# Patient Record
Sex: FEMALE | Race: WHITE | NOT HISPANIC OR LATINO | Employment: STUDENT | URBAN - METROPOLITAN AREA
[De-identification: names, ages, dates, MRNs, and addresses within clinical notes are randomized per-mention and may not be internally consistent; named-entity substitution may affect disease eponyms.]

---
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
## 2022-08-23 ENCOUNTER — TRANSFERRED RECORDS (OUTPATIENT)
Dept: HEALTH INFORMATION MANAGEMENT | Facility: CLINIC | Age: 22
End: 2022-08-23

## 2022-08-29 ENCOUNTER — MEDICAL CORRESPONDENCE (OUTPATIENT)
Dept: HEALTH INFORMATION MANAGEMENT | Facility: CLINIC | Age: 22
End: 2022-08-29

## 2022-09-06 ENCOUNTER — TRANSCRIBE ORDERS (OUTPATIENT)
Dept: OTHER | Age: 22
End: 2022-09-06

## 2022-09-06 DIAGNOSIS — E23.6 RATHKE'S CYST (H): Primary | ICD-10-CM

## 2022-09-06 DIAGNOSIS — E23.6 RATHKE'S CLEFT CYST (H): ICD-10-CM

## 2022-09-08 ENCOUNTER — MEDICAL CORRESPONDENCE (OUTPATIENT)
Dept: HEALTH INFORMATION MANAGEMENT | Facility: CLINIC | Age: 22
End: 2022-09-08

## 2022-09-13 ENCOUNTER — TELEPHONE (OUTPATIENT)
Dept: NEUROSURGERY | Facility: CLINIC | Age: 22
End: 2022-09-13

## 2022-09-13 NOTE — TELEPHONE ENCOUNTER
Writer ERNEE for pt regarding neurosurgery referral    Please schedule a new, in person or video visit with Dr. Chiu or Luna Saunders for next available    Lizett Rush

## 2022-09-14 NOTE — TELEPHONE ENCOUNTER
9/14/2022-Spoke with [facility] to have images pushed ASAP-MR @ 201pm    9/20/2022-Images now in PACS-MR @ 606am    9/23/2022-Request for images faxed to [facility]-MR @ 1140am  -[facility] Images now in PACS, Reports scanned to chart-MR @ 226pm      FUTURE VISIT INFORMATION      FUTURE VISIT INFORMATION:    Date: 9/23/2022    Time: 1pm    Location: [facility]  REFERRAL INFORMATION:    Referring provider:  Dr. Chaudhary    Referring providers clinic:  [facility]     Reason for visit/diagnosis  Rathke's Cleft Cyst     RECORDS REQUESTED FROM:       Clinic name Comments Records Status Imaging Status   [facility]   Scanned to chart  Requested          [facility]   Care Everywhere Requested          [facility]  Requested  Requested

## 2022-09-23 ENCOUNTER — LAB (OUTPATIENT)
Dept: LAB | Facility: CLINIC | Age: 22
End: 2022-09-23

## 2022-09-23 ENCOUNTER — OFFICE VISIT (OUTPATIENT)
Dept: NEUROSURGERY | Facility: CLINIC | Age: 22
End: 2022-09-23
Payer: COMMERCIAL

## 2022-09-23 ENCOUNTER — PRE VISIT (OUTPATIENT)
Dept: NEUROSURGERY | Facility: CLINIC | Age: 22
End: 2022-09-23

## 2022-09-23 VITALS — DIASTOLIC BLOOD PRESSURE: 83 MMHG | OXYGEN SATURATION: 97 % | SYSTOLIC BLOOD PRESSURE: 136 MMHG | HEART RATE: 95 BPM

## 2022-09-23 DIAGNOSIS — D35.2 BENIGN NEOPLASM OF PITUITARY GLAND AND CRANIOPHARYNGEAL DUCT (POUCH) (H): ICD-10-CM

## 2022-09-23 DIAGNOSIS — D35.3 BENIGN NEOPLASM OF PITUITARY GLAND AND CRANIOPHARYNGEAL DUCT (POUCH) (H): Primary | ICD-10-CM

## 2022-09-23 DIAGNOSIS — D35.3 BENIGN NEOPLASM OF PITUITARY GLAND AND CRANIOPHARYNGEAL DUCT (POUCH) (H): ICD-10-CM

## 2022-09-23 DIAGNOSIS — D35.2 BENIGN NEOPLASM OF PITUITARY GLAND AND CRANIOPHARYNGEAL DUCT (POUCH) (H): Primary | ICD-10-CM

## 2022-09-23 LAB
ANION GAP SERPL CALCULATED.3IONS-SCNC: 11 MMOL/L (ref 7–15)
BUN SERPL-MCNC: 7.3 MG/DL (ref 6–20)
CALCIUM SERPL-MCNC: 9.5 MG/DL (ref 8.6–10)
CHLORIDE SERPL-SCNC: 110 MMOL/L (ref 98–107)
CORTIS SERPL-MCNC: 7.9 UG/DL
CREAT SERPL-MCNC: 0.66 MG/DL (ref 0.51–0.95)
DEPRECATED HCO3 PLAS-SCNC: 20 MMOL/L (ref 22–29)
FSH SERPL IRP2-ACNC: 6.2 MIU/ML
GFR SERPL CREATININE-BSD FRML MDRD: >90 ML/MIN/1.73M2
GLUCOSE SERPL-MCNC: 100 MG/DL (ref 70–99)
LH SERPL-ACNC: 8.6 MIU/ML
POTASSIUM SERPL-SCNC: 3.7 MMOL/L (ref 3.4–5.3)
PROLACTIN SERPL 3RD IS-MCNC: 8 NG/ML (ref 5–23)
SODIUM SERPL-SCNC: 141 MMOL/L (ref 136–145)
T4 FREE SERPL-MCNC: 1.27 NG/DL (ref 0.9–1.7)
TSH SERPL DL<=0.005 MIU/L-ACNC: 1.5 UIU/ML (ref 0.3–4.2)

## 2022-09-23 PROCEDURE — 84146 ASSAY OF PROLACTIN: CPT | Mod: 90 | Performed by: PATHOLOGY

## 2022-09-23 PROCEDURE — 99000 SPECIMEN HANDLING OFFICE-LAB: CPT | Performed by: PATHOLOGY

## 2022-09-23 PROCEDURE — 83002 ASSAY OF GONADOTROPIN (LH): CPT | Mod: 90 | Performed by: PATHOLOGY

## 2022-09-23 PROCEDURE — 83003 ASSAY GROWTH HORMONE (HGH): CPT | Mod: 90 | Performed by: PATHOLOGY

## 2022-09-23 PROCEDURE — 84439 ASSAY OF FREE THYROXINE: CPT | Mod: 90 | Performed by: PATHOLOGY

## 2022-09-23 PROCEDURE — 36415 COLL VENOUS BLD VENIPUNCTURE: CPT | Performed by: PATHOLOGY

## 2022-09-23 PROCEDURE — 82533 TOTAL CORTISOL: CPT | Mod: 90 | Performed by: PATHOLOGY

## 2022-09-23 PROCEDURE — 84443 ASSAY THYROID STIM HORMONE: CPT | Mod: 90 | Performed by: PATHOLOGY

## 2022-09-23 PROCEDURE — 84305 ASSAY OF SOMATOMEDIN: CPT | Mod: 90 | Performed by: PATHOLOGY

## 2022-09-23 PROCEDURE — 84403 ASSAY OF TOTAL TESTOSTERONE: CPT | Mod: 90 | Performed by: PATHOLOGY

## 2022-09-23 PROCEDURE — 82024 ASSAY OF ACTH: CPT | Mod: 90 | Performed by: PATHOLOGY

## 2022-09-23 PROCEDURE — 83001 ASSAY OF GONADOTROPIN (FSH): CPT | Mod: 90 | Performed by: PATHOLOGY

## 2022-09-23 PROCEDURE — 80048 BASIC METABOLIC PNL TOTAL CA: CPT | Performed by: PATHOLOGY

## 2022-09-23 PROCEDURE — 99204 OFFICE O/P NEW MOD 45 MIN: CPT | Performed by: PHYSICIAN ASSISTANT

## 2022-09-23 RX ORDER — CETIRIZINE HYDROCHLORIDE 10 MG/1
10 TABLET, CHEWABLE ORAL DAILY
COMMUNITY

## 2022-09-23 RX ORDER — TOPIRAMATE 50 MG/1
50 TABLET, FILM COATED ORAL 2 TIMES DAILY
COMMUNITY

## 2022-09-23 ASSESSMENT — PAIN SCALES - GENERAL: PAINLEVEL: NO PAIN (0)

## 2022-09-23 NOTE — LETTER
2022       RE: Tammy Smith  [address]   [facility] [address]     Dear Colleague,    Thank you for referring your patient, Tammy Smith, to the [facility] [place] at [facility]. Please see a copy of my visit note below.      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 She reports that she developed new migraine headaches in  of this year. Additionally, she has not had a menstrual period since 2021. She was using Depo-provera for increased menstrual cramping, last injection >6 months ago. She reports weight gain, nausea, intermittent abdominal pain, and diarrhea ongoing for the past 1 year. MRI was performed due to her new headaches which revealed a cystic pituitary mass measuring 4mm. She reports headaches were 4-5 days per week, now 1 per week after being placed on daily Topamax. She denies new fever, blurry or double vision, dizziness, gait instability, paresthesias, or weakness.     She is single, lives with her parents. She was working as a  but recently quit her job, is currently looking for something new.   She's a nonsmoker.     Review of Systems:   Pertinent items are noted in HPI or as in patient entered ROS below, remainder of complete ROS is negative.     Active Medications:     Current Outpatient Medications:      cetirizine (ZYRTEC) 10 MG CHEW, Take 10 mg by mouth daily, Disp: , Rfl:      topiramate (TOPAMAX) 50 MG tablet, Take 50 mg by mouth 2 times daily, Disp: , Rfl:       Allergies:   Cats and Lupine bean  extract      Past Medical History:  Amenorrhea  New migraine headaches     Past Surgical History:  None    Family History:   Noncontributory     Social History:   Social History     Tobacco Use     Smoking status: Never Smoker     Smokeless tobacco: Never Used        Physical Exam:   /83   Pulse 95   SpO2 97%    General: No acute distress.   Eyes: Conjunctivae are normal.  MSK: Moves all extremities.  No obvious deformity.  Neuro: The patient is fully oriented. Speech is normal. Extraocular movements are intact without nystagmus. Facial sensation is intact in V1, V2, V3 distributions. Facial nerve function is normal, rated as a House Brackmann 1. Shoulders are full strength. There is no pronator drift. Full strength in all extremities. Sensation intact throughout. No dysmetria with finger-nose-finger testing. Gait is normal   Psych: Normal mood and affect. Behavior is normal.      Imaging:  We reviewed the MRI done 8/23/2022 which revealed a 4mm cystic appearing pituitary lesion that does not come into contact with the optic apparatus.      Assessment:  1. 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 Amenorrhea  3. Migraine headaches  4. Nausea, diarrhea, and abdominal pain    Plan:  1. Will plan to repeat imaging in 3 months prior to next follow up in clinic. We discussed the differential for this mass which would include Rathke's cleft cyst vs craniopharyngioma vs pituitary adenoma. We will obtain a pituitary panel today and follow up by phone with results. If any abnormality, will try to fit her in sooner than end of December for Endo consult.   2. As above, lab work today. Unclear if related to this mass. Would recommend OB/Gyn consult if labs WNL.  3. Continue Topamax. Will follow, consider Neurology consult if not continuing to improve.   4. Likely unrelated, advised discussion with PCP.    Luna Saunders PA-C  Department of Neurosurgery  Center for Skull Base and Pituitary Surgery  [place]  [facility]

## 2022-09-23 NOTE — PROGRESS NOTES
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 She reports that she developed new migraine headaches in  of this year. Additionally, she has not had a menstrual period since 2021. She was using Depo-provera for increased menstrual cramping, last injection >6 months ago. She reports weight gain, nausea, intermittent abdominal pain, and diarrhea ongoing for the past 1 year. MRI was performed due to her new headaches which revealed a cystic pituitary mass measuring 4mm. She reports headaches were 4-5 days per week, now 1 per week after being placed on daily Topamax. She denies new fever, blurry or double vision, dizziness, gait instability, paresthesias, or weakness.     She is single, lives with her parents. She was working as a  but recently quit her job, is currently looking for something new.   She's a nonsmoker.     Review of Systems:   Pertinent items are noted in HPI or as in patient entered ROS below, remainder of complete ROS is negative.     Active Medications:     Current Outpatient Medications:      cetirizine (ZYRTEC) 10 MG CHEW, Take 10 mg by mouth daily, Disp: , Rfl:      topiramate (TOPAMAX) 50 MG tablet, Take 50 mg by mouth 2 times daily, Disp: , Rfl:       Allergies:   Cats and Lupine bean extract      Past Medical History:  Amenorrhea  New migraine headaches     Past Surgical History:  None    Family History:   Noncontributory     Social History:   Social History     Tobacco Use     Smoking status: Never Smoker     Smokeless tobacco: Never Used        Physical Exam:   /83   Pulse 95   SpO2 97%     General: No acute distress.   Eyes: Conjunctivae are normal.  MSK: Moves all extremities.  No obvious deformity.  Neuro: The patient is fully oriented. Speech is normal. Extraocular movements are intact without nystagmus. Facial sensation is intact in V1, V2, V3 distributions. Facial nerve function is normal, rated as a House Brackmann 1. Shoulders are full strength. There is no pronator drift. Full strength in all extremities. Sensation intact throughout. No dysmetria with finger-nose-finger testing. Gait is normal   Psych: Normal mood and affect. Behavior is normal.      Imaging:  We reviewed the MRI done 8/23/2022 which revealed a 4mm cystic appearing pituitary lesion that does not come into contact with the optic apparatus.      Assessment:  1. 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 Amenorrhea  3. Migraine headaches  4. Nausea, diarrhea, and abdominal pain    Plan:  1. Will plan to repeat imaging in 3 months prior to next follow up in clinic. We discussed the differential for this mass which would include Rathke's cleft cyst vs craniopharyngioma vs pituitary adenoma. We will obtain a pituitary panel today and follow up by phone with results. If any abnormality, will try to fit her in sooner than end of December for Endo consult.   2. As above, lab work today. Unclear if related to this mass. Would recommend OB/Gyn consult if labs WNL.  3. Continue Topamax. Will follow, consider Neurology consult if not continuing to improve.   4. Likely unrelated, advised discussion with PCP.    Luna Saunders PA-C  Department of Neurosurgery  Center for Skull Base and Pituitary Surgery  [facility]

## 2022-09-26 LAB — GH SERPL-MCNC: 5 UG/L

## 2022-09-27 LAB
ACTH PLAS-MCNC: 15 PG/ML
IGF-I BLD-MCNC: 270 NG/ML (ref 109–372)
TESTOST SERPL-MCNC: 17 NG/DL (ref 8–60)

## 2022-09-29 ENCOUNTER — OFFICE VISIT (OUTPATIENT)
Dept: FAMILY MEDICINE | Facility: CLINIC | Age: 22
End: 2022-09-29
Payer: COMMERCIAL

## 2022-09-29 VITALS
DIASTOLIC BLOOD PRESSURE: 74 MMHG | OXYGEN SATURATION: 97 % | SYSTOLIC BLOOD PRESSURE: 100 MMHG | TEMPERATURE: 99 F | HEART RATE: 119 BPM | WEIGHT: 158 LBS

## 2022-09-29 DIAGNOSIS — Z11.3 SCREENING FOR STDS (SEXUALLY TRANSMITTED DISEASES): ICD-10-CM

## 2022-09-29 DIAGNOSIS — R76.8 ANA POSITIVE: ICD-10-CM

## 2022-09-29 DIAGNOSIS — G44.209 TENSION HEADACHE: Primary | ICD-10-CM

## 2022-09-29 DIAGNOSIS — Z11.59 NEED FOR HEPATITIS C SCREENING TEST: ICD-10-CM

## 2022-09-29 DIAGNOSIS — R11.0 NAUSEA: ICD-10-CM

## 2022-09-29 DIAGNOSIS — E23.6: ICD-10-CM

## 2022-09-29 DIAGNOSIS — Z11.4 SCREENING FOR HIV (HUMAN IMMUNODEFICIENCY VIRUS): ICD-10-CM

## 2022-09-29 PROCEDURE — 99213 OFFICE O/P EST LOW 20 MIN: CPT | Performed by: INTERNAL MEDICINE

## 2022-09-29 RX ORDER — PROCHLORPERAZINE MALEATE 10 MG
10 TABLET ORAL EVERY 6 HOURS PRN
Qty: 30 TABLET | Refills: 4 | Status: SHIPPED | OUTPATIENT
Start: 2022-09-29

## 2022-09-29 NOTE — PROGRESS NOTES
Assessment & Plan   Problem List Items Addressed This Visit     Cyst of Rathke's pouch (H)      Other Visit Diagnoses     Tension headache    -  Primary    Screening for STDs (sexually transmitted diseases)        Screening for HIV (human immunodeficiency virus)        Need for hepatitis C screening test        Nausea        Relevant Medications    prochlorperazine (COMPAZINE) 10 MG tablet    KIANA positive        Relevant Orders    Adult Rheumatology  Referral         Patient with dysmenorrhea and rathke's pouch without clear tissue component or signs of pituitary function.  However, has functional changes in menstrual cycle.   No risk of pregnancy at this time   No signs of eye symptoms   Has endocrine pending     Has mild elevation in KIANA without additional symptoms of jacqueline or autoimmune     There was recommendation from previous provider to see rheum and appointment is set          Work on weight loss  Regular exercise    Return in about 3 months (around 12/29/2022) for follow up of condition.    Bob Parada MD  [facility] LYODA Munoz is a 22 year old, presenting for the following health issues:  Amenorrhea    headahe -     Lower abdominal pain - uneasiness .  No burning   No pressure,  Stabbing from time to time .  After eating - not consistent .  Diarrhea .  No chance of pregnancy     Missing periods ( off the depo shot in January 2/2021  Migraine headache  E consult -   Kiana positive 1:320  --- >     Going to school college - some accomodations for it -   Sophomore. - went well .  No oral contraceptive .  Pelvic ultrasound was good .    August   topamax helped significnatly    No substances.   zofran     Compazine -    Doctor leaving main clinic ( [facility])   Dec 26               History of Present Illness       Headaches:   Since the patient's last clinic visit, headaches are: improved  The patient is getting headaches:  Used to be 4-5 now like  "maybe once a week  She is able to do normal daily activities when she has a migraine.  The patient is taking the following rescue/relief medications:  Tylenol   Patient states \"The relief is inconsistent\" from the rescue/relief medications.   The patient is taking the following medications to prevent migraines:  Topomax  In the past 4 weeks, the patient has gone to an Urgent Care or Emergency Room 0 times times due to headaches.    Reason for visit:  Missing period, migraines, stomach pains, nausea, weight gain, cyst  Symptom onset:  More than a month  Symptoms include:  Same as above  Symptom intensity:  Moderate  Symptom progression:  Staying the same  Had these symptoms before:  No  What makes it worse:  No  What makes it better:  Medication helps migraines    She eats 2-3 servings of fruits and vegetables daily.She consumes 1 sweetened beverage(s) daily.She exercises with enough effort to increase her heart rate 10 to 19 minutes per day.  She exercises with enough effort to increase her heart rate 5 days per week. She is missing 1 dose(s) of medications per week.  She is not taking prescribed medications regularly due to remembering to take.           Review of Systems   Constitutional, HEENT, cardiovascular, pulmonary, gi and gu systems are negative, except as otherwise noted.      Objective    /74 (BP Location: Left arm, Patient Position: Chair, Cuff Size: Adult Regular)   Pulse 119   Temp 99  F (37.2  C)   Wt 71.7 kg (158 lb)   SpO2 97%   There is no height or weight on file to calculate BMI.  Physical Exam   GENERAL: healthy, alert and no distress  EYES: Eyes grossly normal to inspection, PERRL and conjunctivae and sclerae normal  HENT: ear canals and TM's normal, nose and mouth without ulcers or lesions  NECK: no adenopathy, no asymmetry, masses, or scars and thyroid normal to palpation  RESP: lungs clear to auscultation - no rales, rhonchi or wheezes  CV: regular rate and rhythm, normal S1 S2, no " S3 or S4, no murmur, click or rub, no peripheral edema and peripheral pulses strong  ABDOMEN: soft, nontender, no hepatosplenomegaly, no masses and bowel sounds normal  MS: no gross musculoskeletal defects noted, no edema  SKIN: no suspicious lesions or rashes  NEURO: Normal strength and tone, mentation intact and speech normal  BACK: no CVA tenderness, no paralumbar tenderness  PSYCH: mentation appears normal, affect normal/bright  LYMPH: no cervical, supraclavicular, axillary, or inguinal adenopathy    No results found for any visits on 09/29/22.

## 2022-10-03 PROBLEM — E23.6: Status: ACTIVE | Noted: 2022-10-03

## 2022-10-03 PROBLEM — N94.6 DYSMENORRHEA IN ADOLESCENT: Status: ACTIVE | Noted: 2019-09-21

## 2022-10-24 ENCOUNTER — TELEPHONE (OUTPATIENT)
Dept: NEUROSURGERY | Facility: CLINIC | Age: 22
End: 2022-10-24

## 2022-11-20 ENCOUNTER — HEALTH MAINTENANCE LETTER (OUTPATIENT)
Age: 22
End: 2022-11-20

## 2022-12-20 ENCOUNTER — ANCILLARY PROCEDURE (OUTPATIENT)
Dept: MRI IMAGING | Facility: CLINIC | Age: 22
End: 2022-12-20
Attending: PHYSICIAN ASSISTANT
Payer: COMMERCIAL

## 2022-12-20 DIAGNOSIS — D35.2 BENIGN NEOPLASM OF PITUITARY GLAND AND CRANIOPHARYNGEAL DUCT (POUCH) (H): ICD-10-CM

## 2022-12-20 DIAGNOSIS — D35.3 BENIGN NEOPLASM OF PITUITARY GLAND AND CRANIOPHARYNGEAL DUCT (POUCH) (H): ICD-10-CM

## 2022-12-20 PROCEDURE — A9585 GADOBUTROL INJECTION: HCPCS | Performed by: RADIOLOGY

## 2022-12-20 PROCEDURE — 70543 MRI ORBT/FAC/NCK W/O &W/DYE: CPT | Mod: GC | Performed by: RADIOLOGY

## 2022-12-20 RX ORDER — GADOBUTROL 604.72 MG/ML
7.5 INJECTION INTRAVENOUS ONCE
Status: COMPLETED | OUTPATIENT
Start: 2022-12-20 | End: 2022-12-20

## 2022-12-20 RX ADMIN — GADOBUTROL 7.5 ML: 604.72 INJECTION INTRAVENOUS at 15:57

## 2022-12-28 ENCOUNTER — OFFICE VISIT (OUTPATIENT)
Dept: NEUROSURGERY | Facility: CLINIC | Age: 22
End: 2022-12-28
Payer: COMMERCIAL

## 2022-12-28 VITALS
OXYGEN SATURATION: 97 % | SYSTOLIC BLOOD PRESSURE: 111 MMHG | WEIGHT: 147.9 LBS | DIASTOLIC BLOOD PRESSURE: 69 MMHG | HEART RATE: 80 BPM

## 2022-12-28 DIAGNOSIS — D35.2 PITUITARY ADENOMA WITH EXTRASELLAR EXTENSION (H): Primary | ICD-10-CM

## 2022-12-28 PROCEDURE — 99213 OFFICE O/P EST LOW 20 MIN: CPT | Performed by: NEUROLOGICAL SURGERY

## 2022-12-28 NOTE — Clinical Note
2022       RE: Tammy Smith  [address]   [facility] [address]     Dear Colleague,    Thank you for referring your patient, Tammy Smith, to the [facility] [place] at [facility]. Please see a copy of my visit note below.      Center for Skull Base and Pituitary Surgery      Name: Tammy Smith  : 2000  Referring provider: Luna Saunders  2022      Reason for visit: pituitary cyst, follow up visit    Dear Dr. Parada,    It was a pleasure to see Ms. Smith back in the Center for Skull Base [facility] Pituitary Surgery today in follow up.  As you recall, Ms. 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 She was seen by Luna Saunders on 22 for consultation and establish neurosurgical care. At that time she reported significant improvement in her migraines after starting daily topamax.     Today she reports continuation of her migraines to around 1 per week. Her migraines started in May of last year and did have associated diplopia prior to starting Topamax, but has not had this since. She was on birth control until last January and recently got her first menses since this.        Review of Systems:   Pertinent items are noted in HPI, remainder of complete ROS is negative.      Active Medications: Zyrtec, Topamax,     Allergies: no drug allergies     Past Medical History: PCOS, Migraine, positive HAVEN     Past Surgical History: none    Family History: non-contributory     Social History: She works at [employer] in [place] and also attends school.        Physical Exam:   /69 (BP Location: Left arm, Patient Position: Sitting, Cuff Size: Adult Large)   Pulse 80   Wt 67.1 kg (147 lb 14.4 oz)   SpO2 97%      General: No acute distress.   Head: No signs of trauma.    Eyes: Conjunctivae are  normal.  Mouth/Throat: Oropharynx moist.  Neck: Normal range of motion.    Resp: No respiratory distress.   MSK: Moves all extremities.  No obvious deformity.  Neuro: The patient is fully oriented and quite pleasant. Speech normal. Extraocular movements are intact without nystagmus. 20/20 bilaterally corrected.  Facial sensation is intact in V1, V2, V3 distributions. Facial nerve function is normal, rated as a House Brackmann I/VI, without synkinesis. . Palate is symmetric. Shoulders are full strength. Tongue is midline. There is no pronator drift. Full strength in all extremities.   Psych: Normal mood and affect. Behavior is normal.      Lab:   Cortisol: 14.8  TSH: 1.33  LH: 3.3  FSH: 3.3  T4 Free: 1.05  Testosterone: 27  Prolactin: 7.93    Imaging:  We reviewed her recent MRI 12/20/22 and compared it to the prior MRI from last year. This demonstrates:    1. Unchanged small 5 mm cystic focus within the pituitary gland which may represent cystic microadenoma. The location is slightly anterior  to that expected for a Rathke cleft cyst.  2. Probable unusual CSF pulsation along the right side of the  pituitary stalk. As this appears to have persisted since the prior  study, consider MRA on the next pituitary MRI follow up to exclude an  aneurysm.    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 We reviewed her MRI with her today and discussed the likelihood of it representing a pituitary adenoma or Rathke's cyst. The overall etiology of her lesion suggests a benign growth and this is supported by radiographic stability. We will have her follow-up in 1 year with repeat MRI continued surveillance. She is amenable to this plan.   2. I encouraged her to contact us should any questions or concerns arise in advance of her next appointment      It has been a pleasure to participate in the care of your patient. Please feel free to contact us if we may be of any assistance for Ms.  Luis.    Vijay Chiu MD   Department of Neurosurgery  Center for Skull Base and Pituitary Surgery  [facility]      30 minutes spent on the date of the encounter doing chart review, review of outside records, review of test results, interpretation of tests, patient visit, documentation and discussion with other provider(s)       Scribe Disclosure:  I, August Ledy, am serving as a scribe to document services personally performed by Vijay Chiu MD at this visit, based upon the provider's statements to me. All documentation has been reviewed by the aforementioned provider prior to being entered into the official medical record.           Again, thank you for allowing me to participate in the care of your patient.      Sincerely,    Vijay Chiu MD     Unknown

## 2022-12-28 NOTE — PROGRESS NOTES
[facility] Pituitary [facility]      Name: Tammy Smith  : 2000  Referring provider: Luna Saunders  2022      Reason for visit: pituitary cyst, follow up visit    Dear Dr. Parada,    It was a pleasure to see Ms. Smith back in the Center for Sk[facility] Pituitary Surgery today in follow up.  As you recall, Ms. 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 She was seen by Luna Saunders on 22 for consultation and establish neurosurgical care. At that time she reported significant improvement in her migraines after starting daily topamax.     Today she reports continuation of her migraines to around 1 per week. Her migraines started in May of last year and did have associated diplopia prior to starting Topamax, but has not had this since. She was on birth control until last January and recently got her first menses since this.        Review of Systems:   Pertinent items are noted in HPI, remainder of complete ROS is negative.      Active Medications: Zyrtec, Topamax,     Allergies: no drug allergies     Past Medical History: PCOS, Migraine, positive HAVEN     Past Surgical History: none    Family History: non-contributory     Social History: She works at [employer] in [place] and also attends school.        Physical Exam:   /69 (BP Location: Left arm, Patient Position: Sitting, Cuff Size: Adult Large)   Pulse 80   Wt 67.1 kg (147 lb 14.4 oz)   SpO2 97%      General: No acute distress.   Head: No signs of trauma.    Eyes: Conjunctivae are normal.  Mouth/Throat: Oropharynx moist.  Neck: Normal range of motion.    Resp: No respiratory distress.   MSK: Moves all extremities.  No obvious deformity.  Neuro: The patient is fully oriented and quite pleasant. Speech normal. Extraocular movements are intact without nystagmus. 20/20 bilaterally corrected.  Facial sensation is intact  in V1, V2, V3 distributions. Facial nerve function is normal, rated as a House Brackmann I/VI, without synkinesis. . Palate is symmetric. Shoulders are full strength. Tongue is midline. There is no pronator drift. Full strength in all extremities.   Psych: Normal mood and affect. Behavior is normal.      Lab:   Cortisol: 14.8  TSH: 1.33  LH: 3.3  FSH: 3.3  T4 Free: 1.05  Testosterone: 27  Prolactin: 7.93    Imaging:  We reviewed her recent MRI 12/20/22 and compared it to the prior MRI from last year. This demonstrates:    1. Unchanged small 5 mm cystic focus within the pituitary gland which may represent cystic microadenoma. The location is slightly anterior  to that expected for a Rathke cleft cyst.  2. Probable unusual CSF pulsation along the right side of the  pituitary stalk. As this appears to have persisted since the prior  study, consider MRA on the next pituitary MRI follow up to exclude an  aneurysm.    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 We reviewed her MRI with her today and discussed the likelihood of it representing a pituitary adenoma or Rathke's cyst. The overall etiology of her lesion suggests a benign growth and this is supported by radiographic stability. We will have her follow-up in 1 year with repeat MRI continued surveillance. She is amenable to this plan.   2. I encouraged her to contact us should any questions or concerns arise in advance of her next appointment      It has been a pleasure to participate in the care of your patient. Please feel free to contact us if we may be of any assistance for Ms. Smith.    Vijay Chiu MD   Department of Neurosurgery  [facility] Sk[facility] Base and Pituitary Surgery  [facility]      30 minutes spent on the date of the encounter doing chart review, review of outside records, review of test results, interpretation of tests, patient visit, documentation and discussion with other provider(s)       Scribe  Disclosure:  I, Javier Villafana, am serving as a scribe to document services personally performed by Vijay Chiu MD at this visit, based upon the provider's statements to me. All documentation has been reviewed by the aforementioned provider prior to being entered into the official medical record.

## 2022-12-28 NOTE — NURSING NOTE
Chief Complaint   Patient presents with     RECHECK     Following up on cyst on brain per patient     Sola Velasco CMA at 11:28 AM on 12/28/2022.

## 2022-12-28 NOTE — LETTER
[facility]  [facility] [address]  3RD FLO[address]  [facility] [address]  Phone: [phone]  Fax: [phone]          2022    RE:   Tammy Smith  [address] [place] [address]      Dear Colleague,    Thank you for referring your patient, Tammy Smith, to the [facility]. Please see a copy of my visit note below.        [facility]      Name: Tammy Smith  : 2000  Referring provider: Luna Saunders  2022      Reason for visit: pituitary cyst, follow up visit    Dear Dr. Parada,    It was a pleasure to see Ms. Smith back in the [facility] today in follow up.  As you recall, Ms. 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 She was seen by Luna Saunders on 22 for consultation and establish neurosurgical care. At that time she reported significant improvement in her migraines after starting daily topamax.     Today she reports continuation of her migraines to around 1 per week. Her migraines started in May of last year and did have associated diplopia prior to starting Topamax, but has not had this since. She was on birth control until last January and recently got her first menses since this.        Review of Systems:   Pertinent items are noted in HPI, remainder of complete ROS is negative.      Active Medications: Zyrtec, Topamax,     Allergies: no drug allergies     Past Medical History: PCOS, Migraine, positive HAVEN     Past Surgical History: none    Family History: non-contributory     Social History: She works at [employer] in [place] and also attends school.        Physical Exam:   /69 (BP Location: Left arm, Patient Position: Sitting, Cuff Size: Adult Large)   Pulse 80    Wt 67.1 kg (147 lb 14.4 oz)   SpO2 97%      General: No acute distress.   Head: No signs of trauma.    Eyes: Conjunctivae are normal.  Mouth/Throat: Oropharynx moist.  Neck: Normal range of motion.    Resp: No respiratory distress.   MSK: Moves all extremities.  No obvious deformity.  Neuro: The patient is fully oriented and quite pleasant. Speech normal. Extraocular movements are intact without nystagmus. 20/20 bilaterally corrected.  Facial sensation is intact in V1, V2, V3 distributions. Facial nerve function is normal, rated as a House Brackmann I/VI, without synkinesis. . Palate is symmetric. Shoulders are full strength. Tongue is midline. There is no pronator drift. Full strength in all extremities.   Psych: Normal mood and affect. Behavior is normal.      Lab:   Cortisol: 14.8  TSH: 1.33  LH: 3.3  FSH: 3.3  T4 Free: 1.05  Testosterone: 27  Prolactin: 7.93    Imaging:  We reviewed her recent MRI 12/20/22 and compared it to the prior MRI from last year. This demonstrates:    1. Unchanged small 5 mm cystic focus within the pituitary gland which may represent cystic microadenoma. The location is slightly anterior  to that expected for a Rathke cleft cyst.  2. Probable unusual CSF pulsation along the right side of the  pituitary stalk. As this appears to have persisted since the prior  study, consider MRA on the next pituitary MRI follow up to exclude an  aneurysm.    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 We reviewed her MRI with her today and discussed the likelihood of it representing a pituitary adenoma or Rathke's cyst. The overall etiology of her lesion suggests a benign growth and this is supported by radiographic stability. We will have her follow-up in 1 year with repeat MRI continued surveillance. She is amenable to this plan.   2. I encouraged her to contact us should any questions or concerns arise in advance of her next appointment      It has been a pleasure to  participate in the care of your patient. Please feel free to contact us if we may be of any assistance for Ms. Smith.    Vijay Chiu MD   Department of Neurosurgery  [facility]  [facility]      30 minutes spent on the date of the encounter doing chart review, review of outside records, review of test results, interpretation of tests, patient visit, documentation and discussion with other provider(s)       Scribe Disclosure:  I, Javier Villafana, am serving as a scribe to document services personally performed by Vijay Chiu MD at this visit, based upon the provider's statements to me. All documentation has been reviewed by the aforementioned provider prior to being entered into the official medical record.

## 2023-11-25 ENCOUNTER — HEALTH MAINTENANCE LETTER (OUTPATIENT)
Age: 23
End: 2023-11-25

## 2025-04-19 ENCOUNTER — HEALTH MAINTENANCE LETTER (OUTPATIENT)
Age: 25
End: 2025-04-19